# Patient Record
Sex: MALE | Race: WHITE | NOT HISPANIC OR LATINO | URBAN - METROPOLITAN AREA
[De-identification: names, ages, dates, MRNs, and addresses within clinical notes are randomized per-mention and may not be internally consistent; named-entity substitution may affect disease eponyms.]

---

## 2021-04-03 ENCOUNTER — TRANSFERRED RECORDS (OUTPATIENT)
Dept: HEALTH INFORMATION MANAGEMENT | Facility: CLINIC | Age: 56
End: 2021-04-03

## 2021-04-04 ENCOUNTER — TRANSFERRED RECORDS (OUTPATIENT)
Dept: HEALTH INFORMATION MANAGEMENT | Facility: CLINIC | Age: 56
End: 2021-04-04

## 2021-04-12 ENCOUNTER — TRANSFERRED RECORDS (OUTPATIENT)
Dept: HEALTH INFORMATION MANAGEMENT | Facility: CLINIC | Age: 56
End: 2021-04-12

## 2021-05-05 ENCOUNTER — TRANSFERRED RECORDS (OUTPATIENT)
Dept: HEALTH INFORMATION MANAGEMENT | Facility: CLINIC | Age: 56
End: 2021-05-05

## 2021-05-11 ENCOUNTER — AMBULATORY - HEALTHEAST (OUTPATIENT)
Dept: SURGERY | Facility: CLINIC | Age: 56
End: 2021-05-11

## 2021-05-11 ENCOUNTER — RECORDS - HEALTHEAST (OUTPATIENT)
Dept: SURGERY | Facility: CLINIC | Age: 56
End: 2021-05-11

## 2021-05-11 DIAGNOSIS — Z11.59 ENCOUNTER FOR SCREENING FOR OTHER VIRAL DISEASES: ICD-10-CM

## 2021-05-19 ENCOUNTER — RECORDS - HEALTHEAST (OUTPATIENT)
Dept: ADMINISTRATIVE | Facility: OTHER | Age: 56
End: 2021-05-19

## 2021-05-20 ENCOUNTER — RECORDS - HEALTHEAST (OUTPATIENT)
Dept: ADMINISTRATIVE | Facility: OTHER | Age: 56
End: 2021-05-20

## 2021-05-20 DIAGNOSIS — S05.31XS RUPTURED GLOBE OF RIGHT EYE, SEQUELA: ICD-10-CM

## 2021-05-20 RX ORDER — MAGNESIUM OXIDE 400 MG/1
300 TABLET ORAL DAILY
Status: SHIPPED | COMMUNITY
Start: 2021-05-20

## 2021-05-20 RX ORDER — MULTIPLE VITAMINS W/ MINERALS TAB 9MG-400MCG
1 TAB ORAL DAILY
Status: SHIPPED | COMMUNITY
Start: 2021-05-20

## 2021-05-21 ENCOUNTER — SURGERY - HEALTHEAST (OUTPATIENT)
Dept: SURGERY | Facility: CLINIC | Age: 56
End: 2021-05-21

## 2021-05-21 ENCOUNTER — RECORDS - HEALTHEAST (OUTPATIENT)
Dept: ADMINISTRATIVE | Facility: OTHER | Age: 56
End: 2021-05-21

## 2021-05-21 ENCOUNTER — ANESTHESIA - HEALTHEAST (OUTPATIENT)
Dept: SURGERY | Facility: CLINIC | Age: 56
End: 2021-05-21

## 2021-05-21 RX ORDER — HYDROCODONE BITARTRATE AND ACETAMINOPHEN 5; 325 MG/1; MG/1
1 TABLET ORAL EVERY 4 HOURS PRN
Qty: 15 TABLET | Refills: 0 | Status: SHIPPED | OUTPATIENT
Start: 2021-05-21

## 2021-05-21 RX ORDER — ERYTHROMYCIN 5 MG/G
OINTMENT OPHTHALMIC
Qty: 1 TUBE | Refills: 0 | Status: SHIPPED | OUTPATIENT
Start: 2021-05-21

## 2021-05-21 ASSESSMENT — MIFFLIN-ST. JEOR
SCORE: 1851.85
SCORE: 1856.85

## 2021-05-24 LAB
LAB AP CHARGES (HE HISTORICAL CONVERSION): NORMAL
PATH REPORT.COMMENTS IMP SPEC: NORMAL
PATH REPORT.COMMENTS IMP SPEC: NORMAL
PATH REPORT.FINAL DX SPEC: NORMAL
PATH REPORT.GROSS SPEC: NORMAL
PATH REPORT.RELEVANT HX SPEC: NORMAL
RESULT FLAG (HE HISTORICAL CONVERSION): NORMAL

## 2021-05-27 VITALS — WEIGHT: 226 LBS | BODY MASS INDEX: 29.82 KG/M2 | HEIGHT: 73 IN

## 2021-06-17 NOTE — ANESTHESIA PREPROCEDURE EVALUATION
Anesthesia Evaluation      Patient summary reviewed   No history of anesthetic complications     Airway   Mallampati: II  Neck ROM: full   Pulmonary - negative ROS and normal exam                          Cardiovascular - negative ROS and normal exam  Exercise tolerance: > or = 4 METS   Neuro/Psych - negative ROS     Endo/Other - negative ROS      GI/Hepatic/Renal - negative ROS           Dental - normal exam                        Anesthesia Plan  Planned anesthetic: general endotracheal  Decadron, Zofran.  Diprivan infusion.  Toradol, Tylenol.  Ketamine (0.5 mg/kg).  Magnesium.  ASA 1   Induction: intravenous   Anesthetic plan and risks discussed with: patient  Anesthesia plan special considerations: antiemetics,   Post-op plan: routine recovery

## 2021-06-17 NOTE — ANESTHESIA CARE TRANSFER NOTE
Last vitals:   Vitals:    05/21/21 1347   BP: 179/84   Pulse: 80   Resp: 16   Temp: 36.2  C (97.2  F)   SpO2: 98%     Patient's level of consciousness is drowsy  Spontaneous respirations: yes  Maintains airway independently: yes  Dentition unchanged: yes  Oropharynx: oropharynx clear of all foreign objects    QCDR Measures:  ASA# 20 - Surgical Safety Checklist: WHO surgical safety checklist completed prior to induction    PQRS# 430 - Adult PONV Prevention: 4558F - Pt received => 2 anti-emetic agents (different classes) preop & intraop  ASA# 8 - Peds PONV Prevention: NA - Not pediatric patient, not GA or 2 or more risk factors NOT present  PQRS# 424 - Anabelle-op Temp Management: 4559F - At least one body temp DOCUMENTED => 35.5C or 95.9F within required timeframe  PQRS# 426 - PACU Transfer Protocol: - Transfer of care checklist used  ASA# 14 - Acute Post-op Pain: ASA14B - Patient did NOT experience pain >= 7 out of 10

## 2021-06-17 NOTE — ANESTHESIA POSTPROCEDURE EVALUATION
Patient: Chace Medina  Procedure(s):  ENUCLEATION, (Right)  TEMPORARY TARSORRHAPHY (Right)  Anesthesia type: general    Patient location: Phase II Recovery  Last vitals:   Vitals Value Taken Time   /75 05/21/21 1530   Temp 36.2  C (97.2  F) 05/21/21 1410   Pulse 78 05/21/21 1532   Resp 16 05/21/21 1530   SpO2 97 % 05/21/21 1533   Vitals shown include unvalidated device data.  Post vital signs: stable  Level of consciousness: awake and responds to simple questions  Post-anesthesia pain: pain controlled  Post-anesthesia nausea and vomiting: no  Pulmonary: unassisted, return to baseline  Cardiovascular: stable and blood pressure at baseline  Hydration: adequate  Anesthetic events: no    QCDR Measures:  ASA# 11 - Anabelle-op Cardiac Arrest: ASA11B - Patient did NOT experience unanticipated cardiac arrest  ASA# 12 - Anabelle-op Mortality Rate: ASA12B - Patient did NOT die  ASA# 13 - PACU Re-Intubation Rate: ASA13B - Patient did NOT require a new airway mgmt  ASA# 10 - Composite Anes Safety: ASA10A - No serious adverse event    Additional Notes:

## 2021-06-17 NOTE — OP NOTE
PREOPERATIVE DIAGNOSIS: Blind, painful right eye, post ruptured globe    POSTOPERATIVE DIAGNOSIS: Blind, painful right eye, post ruptured globe    PROCEDURE: Right eye enucleation with placement of a Medpor SST-EZ 20mm implant with the muscles attached.     SURGEON: Jaimee Canales MD     ANESTHESIA: General anesthesia.     COMPLICATIONS: None.     SPECIMEN:Right  eye in formalin for pathologic evaluation.     ESTIMATED BLOOD LOSS: Less than 5 mL.     HISTORY AND INDICATIONS: Chace Medina  presented with a painful blind right eye following traumatic ruptured globe. After the risks, benefits and alternatives to the proposed procedure were explained, informed consent was obtained.     DESCRIPTION OF PROCEDURE: Chace Medina  was brought to the operating room and placed supine on the operating table. General anesthesia was induced. The right  eye was prepped and draped in the typical sterile ophthalmic fashion. Attention was directed to the right  side. A Steve lid speculum was placed to separate the eyelids. A 360-degree conjunctival peritomy was performed with the lemuel scissors. Dissection was carried into the quadrants with the Mcgovern tenotomy scissors. Each rectus muscle was hooked with the Karl muscle hook,  tagged with a double-armed 5-0 Vicryl suture and cut free from the globe. The inferior oblique muscle was identified, cauterized and cut free from the globe. The optic nerve was clamped with a hemostat and severed with the enucleation scissors. The hemostasis was obtained with pressure and bipolar cautery. The implant sizer spheres were used to determine the correct implant size and a 20 mm implant was selected.  The implant was placed in the antibiotic solution. The implant was placed in the socket using the sphere introducer. The rectus muscles were sutured their respective muscle windows in the implant with the pre-placed 5-0 Vicryl sutures. Tenon's layer was closed with interrupted  buried 5-0 Vicryl sutures. The conjunctiva was closed with running 6-0 plain gut suture. Antibiotic ointment, a conformer and a pressure patch were placed. The patient tolerated the procedure well and  left the operating room in stable condition after being awoken from general anesthesia.

## 2021-06-23 ENCOUNTER — DOCUMENTATION ONLY (OUTPATIENT)
Dept: OTHER | Facility: CLINIC | Age: 56
End: 2021-06-23

## 2021-07-06 VITALS
BODY MASS INDEX: 28.3 KG/M2 | WEIGHT: 214.5 LBS | WEIGHT: 214.5 LBS | HEIGHT: 73 IN | HEIGHT: 73 IN | BODY MASS INDEX: 28.43 KG/M2

## 2022-01-12 VITALS — HEIGHT: 73 IN | WEIGHT: 214.5 LBS | BODY MASS INDEX: 28.43 KG/M2
